# Patient Record
Sex: FEMALE | Race: WHITE | NOT HISPANIC OR LATINO
[De-identification: names, ages, dates, MRNs, and addresses within clinical notes are randomized per-mention and may not be internally consistent; named-entity substitution may affect disease eponyms.]

---

## 2018-03-05 ENCOUNTER — APPOINTMENT (OUTPATIENT)
Dept: FAMILY MEDICINE | Facility: CLINIC | Age: 28
End: 2018-03-05
Payer: MEDICAID

## 2018-03-05 VITALS
HEART RATE: 78 BPM | SYSTOLIC BLOOD PRESSURE: 122 MMHG | RESPIRATION RATE: 16 BRPM | WEIGHT: 280 LBS | OXYGEN SATURATION: 98 % | DIASTOLIC BLOOD PRESSURE: 72 MMHG | HEIGHT: 69 IN | BODY MASS INDEX: 41.47 KG/M2

## 2018-03-05 VITALS — SYSTOLIC BLOOD PRESSURE: 110 MMHG | DIASTOLIC BLOOD PRESSURE: 74 MMHG

## 2018-03-05 DIAGNOSIS — Z13.220 ENCOUNTER FOR SCREENING FOR LIPOID DISORDERS: ICD-10-CM

## 2018-03-05 DIAGNOSIS — Z13.228 ENCOUNTER FOR SCREENING FOR OTHER SUSPECTED ENDOCRINE DISORDER: ICD-10-CM

## 2018-03-05 DIAGNOSIS — Z11.59 ENCOUNTER FOR SCREENING FOR OTHER VIRAL DISEASES: ICD-10-CM

## 2018-03-05 DIAGNOSIS — Z80.49 FAMILY HISTORY OF MALIGNANT NEOPLASM OF OTHER GENITAL ORGANS: ICD-10-CM

## 2018-03-05 DIAGNOSIS — Z87.891 PERSONAL HISTORY OF NICOTINE DEPENDENCE: ICD-10-CM

## 2018-03-05 DIAGNOSIS — Z80.42 FAMILY HISTORY OF MALIGNANT NEOPLASM OF PROSTATE: ICD-10-CM

## 2018-03-05 DIAGNOSIS — Z83.49 FAMILY HISTORY OF OTHER ENDOCRINE, NUTRITIONAL AND METABOLIC DISEASES: ICD-10-CM

## 2018-03-05 DIAGNOSIS — R53.83 OTHER FATIGUE: ICD-10-CM

## 2018-03-05 DIAGNOSIS — Z80.3 FAMILY HISTORY OF MALIGNANT NEOPLASM OF BREAST: ICD-10-CM

## 2018-03-05 DIAGNOSIS — Z83.3 FAMILY HISTORY OF DIABETES MELLITUS: ICD-10-CM

## 2018-03-05 DIAGNOSIS — Z13.29 ENCOUNTER FOR SCREENING FOR OTHER SUSPECTED ENDOCRINE DISORDER: ICD-10-CM

## 2018-03-05 DIAGNOSIS — Z13.0 ENCOUNTER FOR SCREENING FOR OTHER SUSPECTED ENDOCRINE DISORDER: ICD-10-CM

## 2018-03-05 PROCEDURE — 36415 COLL VENOUS BLD VENIPUNCTURE: CPT

## 2018-03-05 PROCEDURE — 99385 PREV VISIT NEW AGE 18-39: CPT | Mod: 25

## 2018-03-05 PROCEDURE — 90715 TDAP VACCINE 7 YRS/> IM: CPT

## 2018-03-05 PROCEDURE — 90471 IMMUNIZATION ADMIN: CPT

## 2018-03-05 PROCEDURE — G0444 DEPRESSION SCREEN ANNUAL: CPT

## 2018-03-06 LAB
25(OH)D3 SERPL-MCNC: 23.2 NG/ML
ALBUMIN SERPL ELPH-MCNC: 4.5 G/DL
ALP BLD-CCNC: 86 U/L
ALT SERPL-CCNC: 43 U/L
ANION GAP SERPL CALC-SCNC: 12 MMOL/L
APPEARANCE: ABNORMAL
AST SERPL-CCNC: 27 U/L
BACTERIA: ABNORMAL
BASOPHILS # BLD AUTO: 0.04 K/UL
BASOPHILS NFR BLD AUTO: 0.4 %
BILIRUB SERPL-MCNC: 0.2 MG/DL
BILIRUBIN URINE: NEGATIVE
BLOOD URINE: ABNORMAL
BUN SERPL-MCNC: 9 MG/DL
CALCIUM SERPL-MCNC: 10.1 MG/DL
CHLORIDE SERPL-SCNC: 103 MMOL/L
CHOLEST SERPL-MCNC: 147 MG/DL
CHOLEST/HDLC SERPL: 3.2 RATIO
CO2 SERPL-SCNC: 27 MMOL/L
COLOR: YELLOW
CREAT SERPL-MCNC: 0.71 MG/DL
EOSINOPHIL # BLD AUTO: 0.13 K/UL
EOSINOPHIL NFR BLD AUTO: 1.3 %
FOLATE SERPL-MCNC: 7.4 NG/ML
GLUCOSE QUALITATIVE U: NEGATIVE MG/DL
GLUCOSE SERPL-MCNC: 102 MG/DL
HBA1C MFR BLD HPLC: 5.3 %
HCT VFR BLD CALC: 43.1 %
HDLC SERPL-MCNC: 46 MG/DL
HGB BLD-MCNC: 14.2 G/DL
HYALINE CASTS: 0 /LPF
IMM GRANULOCYTES NFR BLD AUTO: 0.7 %
KETONES URINE: NEGATIVE
LDLC SERPL CALC-MCNC: 43 MG/DL
LEUKOCYTE ESTERASE URINE: NEGATIVE
LYMPHOCYTES # BLD AUTO: 3 K/UL
LYMPHOCYTES NFR BLD AUTO: 29.6 %
MAN DIFF?: NORMAL
MCHC RBC-ENTMCNC: 28.7 PG
MCHC RBC-ENTMCNC: 32.9 GM/DL
MCV RBC AUTO: 87.2 FL
MEV IGG FLD QL IA: >300 AU/ML
MEV IGG+IGM SER-IMP: POSITIVE
MICROSCOPIC-UA: NORMAL
MONOCYTES # BLD AUTO: 0.43 K/UL
MONOCYTES NFR BLD AUTO: 4.2 %
MUV AB SER-ACNC: POSITIVE
MUV IGG SER QL IA: 230 AU/ML
NEUTROPHILS # BLD AUTO: 6.47 K/UL
NEUTROPHILS NFR BLD AUTO: 63.8 %
NITRITE URINE: NEGATIVE
PH URINE: 7.5
PLATELET # BLD AUTO: 268 K/UL
POTASSIUM SERPL-SCNC: 4.4 MMOL/L
PROT SERPL-MCNC: 7.4 G/DL
PROTEIN URINE: NEGATIVE MG/DL
RBC # BLD: 4.94 M/UL
RBC # FLD: 13.6 %
RED BLOOD CELLS URINE: 2 /HPF
RUBV IGG FLD-ACNC: 1.3 INDEX
RUBV IGG SER-IMP: POSITIVE
SODIUM SERPL-SCNC: 142 MMOL/L
SPECIFIC GRAVITY URINE: 1.02
SQUAMOUS EPITHELIAL CELLS: 20 /HPF
T3FREE SERPL-MCNC: 3.27 PG/ML
T4 FREE SERPL-MCNC: 1.2 NG/DL
THYROGLOB AB SERPL-ACNC: <20 IU/ML
THYROPEROXIDASE AB SERPL IA-ACNC: <10 IU/ML
TRIGL SERPL-MCNC: 289 MG/DL
TSH SERPL-ACNC: 1.71 UIU/ML
UROBILINOGEN URINE: NEGATIVE MG/DL
VIT B12 SERPL-MCNC: 570 PG/ML
VZV AB TITR SER: POSITIVE
VZV IGG SER IF-ACNC: 3101 INDEX
WBC # FLD AUTO: 10.14 K/UL
WHITE BLOOD CELLS URINE: 4 /HPF

## 2018-03-07 LAB
ADJUSTED MITOGEN: >10 IU/ML
ADJUSTED TB AG: 0 IU/ML
M TB IFN-G BLD-IMP: NEGATIVE
QUANTIFERON GOLD NIL: 0.02 IU/ML

## 2018-03-08 ENCOUNTER — APPOINTMENT (OUTPATIENT)
Dept: FAMILY MEDICINE | Facility: CLINIC | Age: 28
End: 2018-03-08
Payer: MEDICAID

## 2018-03-08 VITALS
WEIGHT: 280 LBS | HEIGHT: 69 IN | TEMPERATURE: 98.4 F | SYSTOLIC BLOOD PRESSURE: 114 MMHG | OXYGEN SATURATION: 97 % | HEART RATE: 78 BPM | BODY MASS INDEX: 41.47 KG/M2 | DIASTOLIC BLOOD PRESSURE: 76 MMHG

## 2018-03-08 LAB — ANA SER IF-ACNC: NEGATIVE

## 2018-03-08 PROCEDURE — 99213 OFFICE O/P EST LOW 20 MIN: CPT

## 2018-04-13 ENCOUNTER — APPOINTMENT (OUTPATIENT)
Dept: OBGYN | Facility: CLINIC | Age: 28
End: 2018-04-13

## 2018-09-14 ENCOUNTER — APPOINTMENT (OUTPATIENT)
Dept: FAMILY MEDICINE | Facility: CLINIC | Age: 28
End: 2018-09-14

## 2019-02-19 ENCOUNTER — LABORATORY RESULT (OUTPATIENT)
Age: 29
End: 2019-02-19

## 2019-02-19 ENCOUNTER — APPOINTMENT (OUTPATIENT)
Dept: FAMILY MEDICINE | Facility: CLINIC | Age: 29
End: 2019-02-19
Payer: MEDICAID

## 2019-02-19 VITALS
HEART RATE: 64 BPM | BODY MASS INDEX: 44.41 KG/M2 | SYSTOLIC BLOOD PRESSURE: 118 MMHG | WEIGHT: 293 LBS | HEIGHT: 68 IN | RESPIRATION RATE: 16 BRPM | OXYGEN SATURATION: 97 % | DIASTOLIC BLOOD PRESSURE: 68 MMHG

## 2019-02-19 DIAGNOSIS — Z11.1 ENCOUNTER FOR SCREENING FOR RESPIRATORY TUBERCULOSIS: ICD-10-CM

## 2019-02-19 DIAGNOSIS — Z23 ENCOUNTER FOR IMMUNIZATION: ICD-10-CM

## 2019-02-19 DIAGNOSIS — T80.90XA UNSPECIFIED COMPLICATION FOLLOWING INFUSION AND THERAPEUTIC INJECTION, INITIAL ENCOUNTER: ICD-10-CM

## 2019-02-19 DIAGNOSIS — L03.113 CELLULITIS OF RIGHT UPPER LIMB: ICD-10-CM

## 2019-02-19 PROCEDURE — 99395 PREV VISIT EST AGE 18-39: CPT

## 2019-02-19 RX ORDER — CEFADROXIL 500 MG/1
500 CAPSULE ORAL TWICE DAILY
Qty: 14 | Refills: 0 | Status: DISCONTINUED | COMMUNITY
Start: 2018-03-08 | End: 2019-02-19

## 2019-02-19 NOTE — ASSESSMENT
[FreeTextEntry1] : Health care maintenance:\par check blood work \par follow up with optometry/ophthalmology and dentist for routine exams\par follow up with GYN\par c/w diet and exercise as tolerated\par \par Major Depression:\par start Trintellix, risks and benefits discussed\par advised to see in house behavioral health counselor \par \par Morbid obesity:\par focus on diet and exercise

## 2019-02-19 NOTE — HEALTH RISK ASSESSMENT
[3] : 2) Feeling down, depressed, or hopeless for nearly every day (3) [de-identified] : Not exercising regularly  [PapSmearDate] : 2016 [PapSmearComments] : Patient reported PAP Smear was abnormal. Had colposcopy, results were normal.

## 2019-02-19 NOTE — PHYSICAL EXAM
[No Acute Distress] : no acute distress [Well Nourished] : well nourished [Well Developed] : well developed [Well-Appearing] : well-appearing [Normal Sclera/Conjunctiva] : normal sclera/conjunctiva [PERRL] : pupils equal round and reactive to light [Normal Oropharynx] : the oropharynx was normal [Normal TMs] : both tympanic membranes were normal [Normal Appearance] : was normal in appearance [Neck Supple] : was supple [Normal] : the thyroid was normal [No Respiratory Distress] : no respiratory distress  [Clear to Auscultation] : lungs were clear to auscultation bilaterally [Normal Rate] : normal rate  [Regular Rhythm] : with a regular rhythm [Normal S1, S2] : normal S1 and S2 [No Murmur] : no murmur heard [No Carotid Bruits] : no carotid bruits [No Edema] : there was no peripheral edema [Soft] : abdomen soft [Non Tender] : non-tender [Normal Bowel Sounds] : normal bowel sounds [Normal Posterior Cervical Nodes] : no posterior cervical lymphadenopathy [Normal Anterior Cervical Nodes] : no anterior cervical lymphadenopathy [No Spinal Tenderness] : no spinal tenderness [Grossly Normal Strength/Tone] : grossly normal strength/tone [No Rash] : no rash [No Focal Deficits] : no focal deficits [Alert and Oriented x3] : oriented to person, place, and time [Normal Insight/Judgement] : insight and judgment were intact [Obese] : obese [de-identified] : Obese

## 2019-02-19 NOTE — REVIEW OF SYSTEMS
[Fatigue] : fatigue [Depression] : depression [Negative] : Heme/Lymph [Fever] : no fever [Chills] : no chills

## 2019-02-19 NOTE — HISTORY OF PRESENT ILLNESS
[FreeTextEntry1] : Annual physical  [de-identified] : \par 28 year old female presents for annual physical. 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

## 2019-02-20 ENCOUNTER — RX RENEWAL (OUTPATIENT)
Age: 29
End: 2019-02-20

## 2019-02-20 LAB
BASOPHILS # BLD AUTO: 0.04 K/UL
BASOPHILS NFR BLD AUTO: 0.4 %
EOSINOPHIL # BLD AUTO: 0.11 K/UL
EOSINOPHIL NFR BLD AUTO: 1.2 %
HCT VFR BLD CALC: 45 %
HGB BLD-MCNC: 13.7 G/DL
IMM GRANULOCYTES NFR BLD AUTO: 0.6 %
LYMPHOCYTES # BLD AUTO: 2.55 K/UL
LYMPHOCYTES NFR BLD AUTO: 27 %
MAN DIFF?: NORMAL
MCHC RBC-ENTMCNC: 27.2 PG
MCHC RBC-ENTMCNC: 30.4 GM/DL
MCV RBC AUTO: 89.3 FL
MONOCYTES # BLD AUTO: 0.41 K/UL
MONOCYTES NFR BLD AUTO: 4.3 %
NEUTROPHILS # BLD AUTO: 6.28 K/UL
NEUTROPHILS NFR BLD AUTO: 66.5 %
PLATELET # BLD AUTO: 257 K/UL
RBC # BLD: 5.04 M/UL
RBC # FLD: 14.3 %
WBC # FLD AUTO: 9.45 K/UL

## 2019-02-20 RX ORDER — VORTIOXETINE 10 MG/1
10 TABLET, FILM COATED ORAL DAILY
Qty: 30 | Refills: 2 | Status: DISCONTINUED | COMMUNITY
Start: 2019-02-19 | End: 2019-02-20

## 2019-02-25 LAB
25(OH)D3 SERPL-MCNC: 21.1 NG/ML
ALBUMIN SERPL ELPH-MCNC: 4.5 G/DL
ALP BLD-CCNC: 89 U/L
ALT SERPL-CCNC: 54 U/L
ANION GAP SERPL CALC-SCNC: 11 MMOL/L
APPEARANCE: ABNORMAL
AST SERPL-CCNC: 31 U/L
BACTERIA: ABNORMAL
BILIRUB SERPL-MCNC: <0.2 MG/DL
BILIRUBIN URINE: NEGATIVE
BLOOD URINE: NEGATIVE
BUN SERPL-MCNC: 8 MG/DL
CALCIUM SERPL-MCNC: 9.4 MG/DL
CHLORIDE SERPL-SCNC: 105 MMOL/L
CHOLEST SERPL-MCNC: 138 MG/DL
CHOLEST/HDLC SERPL: 3 RATIO
CO2 SERPL-SCNC: 25 MMOL/L
COLOR: NORMAL
CREAT SERPL-MCNC: 0.82 MG/DL
FOLATE SERPL-MCNC: 15.2 NG/ML
GLUCOSE QUALITATIVE U: NEGATIVE
GLUCOSE SERPL-MCNC: 103 MG/DL
HBA1C MFR BLD HPLC: 5.7 %
HDLC SERPL-MCNC: 46 MG/DL
HYALINE CASTS: 5 /LPF
KETONES URINE: NEGATIVE
LDLC SERPL CALC-MCNC: 79 MG/DL
LEUKOCYTE ESTERASE URINE: ABNORMAL
MICROSCOPIC-UA: NORMAL
NITRITE URINE: NEGATIVE
PH URINE: 6
POTASSIUM SERPL-SCNC: 4.2 MMOL/L
PROT SERPL-MCNC: 7.2 G/DL
PROTEIN URINE: NEGATIVE
RED BLOOD CELLS URINE: 4 /HPF
SODIUM SERPL-SCNC: 141 MMOL/L
SPECIFIC GRAVITY URINE: 1.01
SQUAMOUS EPITHELIAL CELLS: 16 /HPF
T4 FREE SERPL-MCNC: 1.1 NG/DL
TRIGL SERPL-MCNC: 63 MG/DL
TSH SERPL-ACNC: 2.35 UIU/ML
UROBILINOGEN URINE: NORMAL
VIT B12 SERPL-MCNC: 400 PG/ML
WHITE BLOOD CELLS URINE: 9 /HPF

## 2019-03-01 ENCOUNTER — APPOINTMENT (OUTPATIENT)
Dept: FAMILY MEDICINE | Facility: CLINIC | Age: 29
End: 2019-03-01

## 2019-03-08 ENCOUNTER — APPOINTMENT (OUTPATIENT)
Dept: FAMILY MEDICINE | Facility: CLINIC | Age: 29
End: 2019-03-08

## 2019-03-22 ENCOUNTER — APPOINTMENT (OUTPATIENT)
Dept: FAMILY MEDICINE | Facility: CLINIC | Age: 29
End: 2019-03-22

## 2019-04-05 ENCOUNTER — RX RENEWAL (OUTPATIENT)
Age: 29
End: 2019-04-05

## 2019-04-05 ENCOUNTER — APPOINTMENT (OUTPATIENT)
Dept: FAMILY MEDICINE | Facility: CLINIC | Age: 29
End: 2019-04-05

## 2019-04-18 ENCOUNTER — APPOINTMENT (OUTPATIENT)
Dept: FAMILY MEDICINE | Facility: CLINIC | Age: 29
End: 2019-04-18

## 2019-04-22 ENCOUNTER — OTHER (OUTPATIENT)
Age: 29
End: 2019-04-22

## 2019-04-24 ENCOUNTER — RX RENEWAL (OUTPATIENT)
Age: 29
End: 2019-04-24

## 2019-08-07 ENCOUNTER — RX RENEWAL (OUTPATIENT)
Age: 29
End: 2019-08-07

## 2019-08-27 ENCOUNTER — APPOINTMENT (OUTPATIENT)
Dept: FAMILY MEDICINE | Facility: CLINIC | Age: 29
End: 2019-08-27

## 2019-09-06 ENCOUNTER — APPOINTMENT (OUTPATIENT)
Dept: FAMILY MEDICINE | Facility: CLINIC | Age: 29
End: 2019-09-06

## 2019-09-17 ENCOUNTER — APPOINTMENT (OUTPATIENT)
Dept: FAMILY MEDICINE | Facility: CLINIC | Age: 29
End: 2019-09-17

## 2020-01-16 ENCOUNTER — RX RENEWAL (OUTPATIENT)
Age: 30
End: 2020-01-16

## 2022-04-11 PROBLEM — Z11.59 SCREENING FOR VIRAL DISEASE: Status: ACTIVE | Noted: 2018-03-05

## 2023-04-17 ENCOUNTER — TRANSCRIPTION ENCOUNTER (OUTPATIENT)
Age: 33
End: 2023-04-17

## 2023-04-17 ENCOUNTER — NON-APPOINTMENT (OUTPATIENT)
Age: 33
End: 2023-04-17

## 2023-04-17 ENCOUNTER — APPOINTMENT (OUTPATIENT)
Dept: INTERNAL MEDICINE | Facility: CLINIC | Age: 33
End: 2023-04-17
Payer: MEDICAID

## 2023-04-17 VITALS
RESPIRATION RATE: 15 BRPM | HEIGHT: 68 IN | OXYGEN SATURATION: 98 % | TEMPERATURE: 98.9 F | WEIGHT: 293 LBS | SYSTOLIC BLOOD PRESSURE: 154 MMHG | BODY MASS INDEX: 44.41 KG/M2 | DIASTOLIC BLOOD PRESSURE: 82 MMHG | HEART RATE: 68 BPM

## 2023-04-17 VITALS — DIASTOLIC BLOOD PRESSURE: 83 MMHG | SYSTOLIC BLOOD PRESSURE: 128 MMHG | HEART RATE: 72 BPM

## 2023-04-17 DIAGNOSIS — Z82.49 FAMILY HISTORY OF ISCHEMIC HEART DISEASE AND OTHER DISEASES OF THE CIRCULATORY SYSTEM: ICD-10-CM

## 2023-04-17 DIAGNOSIS — R79.89 OTHER SPECIFIED ABNORMAL FINDINGS OF BLOOD CHEMISTRY: ICD-10-CM

## 2023-04-17 PROCEDURE — 93000 ELECTROCARDIOGRAM COMPLETE: CPT

## 2023-04-17 PROCEDURE — G0447 BEHAVIOR COUNSEL OBESITY 15M: CPT

## 2023-04-17 PROCEDURE — 99204 OFFICE O/P NEW MOD 45 MIN: CPT | Mod: 25

## 2023-04-17 NOTE — HISTORY OF PRESENT ILLNESS
[FreeTextEntry1] : pt being seen for HBP.  [FreeTextEntry8] : 41F PMH anxiety, ADHD, and obesity presents for complaint of high blood pressure readings. She states that she was recently prescribed Adderall for her ADHD and noticed a BP reading 160/94 a weeks after starting it. She has been measuring her BP at home, first thing in the morning and a couple hours after with an appropriately sized arm cuff and sitting comfortably in the chair. She said her readings fluctuate in the 150s/80s. She denies any new stress or anxiety at home but does have 3 children she is attempting to home school. She has started to workout and eat healthy recently as well as limit her sodium intake. She denies any headaches, blurry vision, chest pain, shortness of breath, abdominal pain, n/v/d/c, edema. She notes she is eating and drinking as per usual. Her family history is significant for her father with HTN. In regards to the Adderall, she has stopped taking it for the past 3 days.\par \par Of note, on 4/12, she has had blood work done with cbc, a1c, tsh, lipids unremarkable. The cmp was normal with the exception of her liver enzymes (ALT 33 and ALT 46). She denies any smoking or alcohol use. \par

## 2023-04-17 NOTE — PHYSICAL EXAM
[Normal] : soft, non-tender, non-distended, no masses palpated, no HSM and normal bowel sounds [de-identified] : obese female

## 2023-04-17 NOTE — PLAN
[FreeTextEntry1] : \par 41F PMH anxiety, ADHD, and obesity presents for complaint of high blood pressure readings.\par \par # elevated blood pressure readings\par - presents to the clinic complaining of elevated BP on multiple home readings - 150s/80s, measured with a cuff pressure while in chair with 2 morning readings. On exam patient appears relatively calm and admits to starting Adderall 2 weeks ago.\par - Patient is otherwise asymptomatic without confusion, headaches, blurry vision, chest pain, shortness of breath, edema.\par - BP today is 154/82 and 128/83 on repeat. HR 70s\par - Patient is not currently on medication for BP\par - 4/12 -  blood work done with cbc, a1c, tsh unremarkable. The cmp was normal with the exception of her liver enzymes (ALT 33 and ALT 46). She denies any smoking or alcohol use. \par - EKG with no ischemic findings - HR 70, \par - patient counseled to repeat blood pressures - perform measurements in a quiet room after five minutes of rest in the seated position with the back and arm supported and legs uncrossed. Will inform me of readings in her next appt next week.\par - patient self dc'd Adderall for now as it can contribute to pressures and will discuss further treatment with her psychiatric NP that she sees.\par - f/u with me in one week\par \par # obesity\par - patient with BMI 50\par - a1c 5.6, lipids unremarkable, elevated AST/ALT\par - patient counseled on weight loss and is implementing a plan to eat healthier and exercising\par - discussed options of injectable weight loss meds and consultation with bariatric surgeon. Patient is hesitant right now but understands the importance. Will discuss more at next visit\par \par # elevate LFTs\par - ALT 33 and ALT 46 (ALT 54 in 2019. Patient denies alcohol or smoking. Most likely VANG given obesity of BMI>50. Patient will focus on lifestyle modifications for now\par - Liver US ordered\par  \par

## 2023-04-17 NOTE — PHYSICAL EXAM
[Normal] : soft, non-tender, non-distended, no masses palpated, no HSM and normal bowel sounds [de-identified] : obese female

## 2023-04-24 ENCOUNTER — APPOINTMENT (OUTPATIENT)
Dept: INTERNAL MEDICINE | Facility: CLINIC | Age: 33
End: 2023-04-24
Payer: MEDICAID

## 2023-04-24 VITALS
BODY MASS INDEX: 44.41 KG/M2 | WEIGHT: 293 LBS | RESPIRATION RATE: 15 BRPM | HEIGHT: 68 IN | OXYGEN SATURATION: 99 % | SYSTOLIC BLOOD PRESSURE: 122 MMHG | TEMPERATURE: 97.8 F | HEART RATE: 65 BPM | DIASTOLIC BLOOD PRESSURE: 70 MMHG

## 2023-04-24 DIAGNOSIS — E66.01 MORBID (SEVERE) OBESITY DUE TO EXCESS CALORIES: ICD-10-CM

## 2023-04-24 DIAGNOSIS — R03.0 ELEVATED BLOOD-PRESSURE READING, W/OUT DIAGNOSIS OF HYPERTENSION: ICD-10-CM

## 2023-04-24 PROCEDURE — 99212 OFFICE O/P EST SF 10 MIN: CPT

## 2023-04-24 NOTE — HISTORY OF PRESENT ILLNESS
[FreeTextEntry1] : Pt being seen for a physical exam and to follow up on blood pressure. [de-identified] : 41F PMH anxiety, ADHD, and obesity presents for a blood pressure follow up visit. She was seen by me last week for concern for asymptomatic elevation in her BPs. She kept a log this past week (attached to her chart) of pressures measured in the morning and at night with the right sized cuff sitting in a chair with arm at heart level./ Her average readings were 140/83 with some high values that she attributes to moments fo stress. Since the visit last week she has been off the Adderall and noticed that she feels more "scattered" and stressed with dealing with things at home and her children. Since last week she has been walking more and joined a gym and is also watching her salt ingestion to try to keep it at a minimum. Otherwise, she denies any headaches, chest pain, shortness of breath, abdominal pain, n/v/d/c, edema. She has an appt in a few days with her psychiatric NP regarding her ADHD and if there is an alternative medication to help with ADHD as well as BP that is appropriate for her.

## 2023-04-24 NOTE — HISTORY OF PRESENT ILLNESS
[FreeTextEntry1] : Pt being seen for a physical exam and to follow up on blood pressure. [de-identified] : 41F PMH anxiety, ADHD, and obesity presents for a blood pressure follow up visit. She was seen by me last week for concern for asymptomatic elevation in her BPs. She kept a log this past week (attached to her chart) of pressures measured in the morning and at night with the right sized cuff sitting in a chair with arm at heart level./ Her average readings were 140/83 with some high values that she attributes to moments fo stress. Since the visit last week she has been off the Adderall and noticed that she feels more "scattered" and stressed with dealing with things at home and her children. Since last week she has been walking more and joined a gym and is also watching her salt ingestion to try to keep it at a minimum. Otherwise, she denies any headaches, chest pain, shortness of breath, abdominal pain, n/v/d/c, edema. She has an appt in a few days with her psychiatric NP regarding her ADHD and if there is an alternative medication to help with ADHD as well as BP that is appropriate for her.

## 2023-04-24 NOTE — PLAN
[FreeTextEntry1] : 41F PMH anxiety, ADHD, and obesity presents for a blood pressure follow up visit. \par \par # high blood pressure\par - today she brought in log on averages of 140/83 over the past week with measurements taken in the morning at at night. In office pressures are 122/70, HR 65.\par - she endorse some stresses at home with her children and daily routines. Denies depression and anxiety at this time and attributes difficulty to cope with stress due to her ADHD\par - she denies any headaches, blurry vision, chest pain, shortness of breath, and edema\par - she enrolled in a gym, started walking everyday, and is watching her salt content since our last appt\par - decision was made to go back on Adderall since it will help her stay focused and reduce the stress - she reports "it has been hard being off of it" - OK for now since it doesn't cause extreme elevations in pressure. Will have appt in a few days with psychiatric NP discussing other options if they feel necessary\par - patient will record another week of logs and will call me in a week to discuss results once she is back on the Adderall - decision will be made to initiate antihypertensives at that time. \par - Patient understands that the elevated pressures can be attributed to weight, diet, stress, etc and is willing to make adjustments prior to starting medications.\par - STOPBANG 2 - low risk of JOSHUA - patient denies snoring/apneic episodes and fatigue during day. 10 year old female, denies past medical history, presenting with 1 week of hives intermittently. Mother states currently patient is asymptomatic but hives come and go. No history of this in the past, and no fevers, vomiting, dyspnea or any other symptoms. No new detergents, soaps, lotions or other topical skin products. No other symptoms.

## 2023-04-24 NOTE — PLAN
[FreeTextEntry1] : 41F PMH anxiety, ADHD, and obesity presents for a blood pressure follow up visit. \par \par # high blood pressure\par - today she brought in log on averages of 140/83 over the past week with measurements taken in the morning at at night. In office pressures are 122/70, HR 65.\par - she endorse some stresses at home with her children and daily routines. Denies depression and anxiety at this time and attributes difficulty to cope with stress due to her ADHD\par - she denies any headaches, blurry vision, chest pain, shortness of breath, and edema\par - she enrolled in a gym, started walking everyday, and is watching her salt content since our last appt\par - decision was made to go back on Adderall since it will help her stay focused and reduce the stress - she reports "it has been hard being off of it" - OK for now since it doesn't cause extreme elevations in pressure. Will have appt in a few days with psychiatric NP discussing other options if they feel necessary\par - patient will record another week of logs and will call me in a week to discuss results once she is back on the Adderall - decision will be made to initiate antihypertensives at that time. \par - Patient understands that the elevated pressures can be attributed to weight, diet, stress, etc and is willing to make adjustments prior to starting medications.\par - STOPBANG 2 - low risk of JOSHUA - patient denies snoring/apneic episodes and fatigue during day.

## 2024-03-22 ENCOUNTER — APPOINTMENT (OUTPATIENT)
Dept: INTERNAL MEDICINE | Facility: CLINIC | Age: 34
End: 2024-03-22
Payer: MEDICAID

## 2024-03-22 ENCOUNTER — NON-APPOINTMENT (OUTPATIENT)
Age: 34
End: 2024-03-22

## 2024-03-22 VITALS
HEIGHT: 68 IN | DIASTOLIC BLOOD PRESSURE: 78 MMHG | SYSTOLIC BLOOD PRESSURE: 120 MMHG | WEIGHT: 293 LBS | BODY MASS INDEX: 44.41 KG/M2 | RESPIRATION RATE: 16 BRPM | OXYGEN SATURATION: 99 % | HEART RATE: 79 BPM | TEMPERATURE: 97.9 F

## 2024-03-22 DIAGNOSIS — Z86.59 PERSONAL HISTORY OF OTHER MENTAL AND BEHAVIORAL DISORDERS: ICD-10-CM

## 2024-03-22 DIAGNOSIS — Z51.81 ENCOUNTER FOR THERAPEUTIC DRUG LVL MONITORING: ICD-10-CM

## 2024-03-22 DIAGNOSIS — E66.9 OBESITY, UNSPECIFIED: ICD-10-CM

## 2024-03-22 DIAGNOSIS — Z00.00 ENCOUNTER FOR GENERAL ADULT MEDICAL EXAMINATION W/OUT ABNORMAL FINDINGS: ICD-10-CM

## 2024-03-22 DIAGNOSIS — E55.9 VITAMIN D DEFICIENCY, UNSPECIFIED: ICD-10-CM

## 2024-03-22 PROCEDURE — G0444 DEPRESSION SCREEN ANNUAL: CPT | Mod: 59

## 2024-03-22 PROCEDURE — 99395 PREV VISIT EST AGE 18-39: CPT

## 2024-03-22 RX ORDER — PAROXETINE HYDROCHLORIDE 40 MG/1
40 TABLET, FILM COATED ORAL DAILY
Qty: 30 | Refills: 0 | Status: DISCONTINUED | COMMUNITY
Start: 2019-02-20 | End: 2024-03-22

## 2024-03-22 NOTE — PLAN
[FreeTextEntry1] : 33F PMH ADHD, elevated LFTs presents for an annual physical.  # umbilical hernia - umbilical hernia found during pregnancy 3 years ago - now causing her pain and discomfort when she is lifting but is reducible at rest - on exam with umbilical hernia visualized, soft, and reducible, no signs of strangulation - given referral to surgery - advised weight loss and alarm symptoms of when to seek emergent care - immense pain, strangulation, etc, none on exam today  # ADHD - on Adderall prescribed by her psychiatric NP - tolerating it well no HTN, palpations, chest pain, shortness of breath, headaches - drug screen ordered today - EKG NSR no acute ischemic changes  # obesity - BMI 46 - patient trying to implement physical activity and healthy diet - encouraged Mediterranean diet - metabolic workup ordered including - cbc, cmp, a1c, lipids, tsh   # screenings - negative depression screening - pap reported normal 3 years ago - given referral for GYN - declines HIV/HCV screening  # vaccines - declines covid vaccines - tdap in 2018 - reports getting flu shot this season

## 2024-03-22 NOTE — HEALTH RISK ASSESSMENT
[Good] : ~his/her~  mood as  good [Yes] : Yes [Never (0 pts)] : Never (0 points) [Monthly or less (1 pt)] : Monthly or less (1 point) [No] : In the past 12 months have you used drugs other than those required for medical reasons? No [No falls in past year] : Patient reported no falls in the past year [PHQ-2 Negative - No further assessment needed] : PHQ-2 Negative - No further assessment needed [0] : 2) Feeling down, depressed, or hopeless: Not at all (0) [Audit-CScore] : 1 [de-identified] : yoga, walking [WJD1Vwtvs] : 0 [de-identified] : gluten-free [LowDoseCTScan] : NA [EyeExamDate] : NA [HIV test declined] : HIV test declined [Patient reported PAP Smear was normal] : Patient reported PAP Smear was normal [Hepatitis C test declined] : Hepatitis C test declined [Language] : denies difficulty with language [Change in mental status noted] : No change in mental status noted [Behavior] : denies difficulty with behavior [Learning/Retaining New Information] : denies difficulty learning/retaining new information [Handling Complex Tasks] : denies difficulty handling complex tasks [Reasoning] : denies difficulty with reasoning [Spatial Ability and Orientation] : denies difficulty with spatial ability and orientation [None] : None [With Family] : lives with family [Employed] : employed [] :  [# Of Children ___] : has [unfilled] children [Fully functional (using the telephone, shopping, preparing meals, housekeeping, doing laundry, using] : Fully functional and needs no help or supervision to perform IADLs (using the telephone, shopping, preparing meals, housekeeping, doing laundry, using transportation, managing medications and managing finances) [Fully functional (bathing, dressing, toileting, transferring, walking, feeding)] : Fully functional (bathing, dressing, toileting, transferring, walking, feeding) [Reports changes in vision] : Reports no changes in vision [Reports changes in hearing] : Reports no changes in hearing [Reports normal functional visual acuity (ie: able to read med bottle)] : Reports normal functional visual acuity [Reports changes in dental health] : Reports no changes in dental health [MammogramDate] : NA [BoneDensityDate] : NA [PapSmearDate] : 3 years ago [ColonoscopyDate] : NA [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

## 2024-03-22 NOTE — HISTORY OF PRESENT ILLNESS
[de-identified] : 33F PMH ADHD, elevated LFTs presents for an annual physical.  She has a complaint of an umbilical hernia which was diagnosed with her pregnancy with her son 3 years ago. She notes it bothers her when she is training and tend to protrude but then able to go back inside. She has not done anything about it or seen any specialists. She denies any symptom of strangulation of the hernia at this time. She is obese and trying to lose weight but implementing yoga and walks. She eats a gluten-free diet.  She takes Adderall prescribed by psychiatric NP which helps her symptoms of ADHD. She is requesting a drug screen and EKG which is needed by her NP to monitor and continue to prescribe the medication. She denies any headaches, palpitations, chest pain, shortness of breath. She is tolerating the medication well.   Otherwise, she denies any smoking and drug use. She drinks alcohol rarely. Her last pap was normal 3 years ago. Her LMP was on 3/3/2024 and she usually gets them every5 weeks with heavy bleeding in which the first 2 days she is changing 2 pads an hour. She lives at home with her  and 3 children and is employed as an LPN. Her tdap was in 2018. She did not receive the covid vaccine and is declining. She got the flu shot this season.

## 2024-03-22 NOTE — PHYSICAL EXAM
[Normal] : affect was normal and insight and judgment were intact [de-identified] : umbilical hernia visualized, soft, and reducible, no signs of strangulation

## 2024-03-25 ENCOUNTER — APPOINTMENT (OUTPATIENT)
Dept: SURGERY | Facility: CLINIC | Age: 34
End: 2024-03-25
Payer: MEDICAID

## 2024-03-25 VITALS
HEART RATE: 79 BPM | DIASTOLIC BLOOD PRESSURE: 83 MMHG | RESPIRATION RATE: 16 BRPM | BODY MASS INDEX: 45.45 KG/M2 | HEIGHT: 67.5 IN | SYSTOLIC BLOOD PRESSURE: 133 MMHG | OXYGEN SATURATION: 96 % | WEIGHT: 293 LBS | TEMPERATURE: 98 F

## 2024-03-25 LAB
25(OH)D3 SERPL-MCNC: 18 NG/ML
ALBUMIN SERPL ELPH-MCNC: 4.6 G/DL
ALP BLD-CCNC: 103 U/L
ALT SERPL-CCNC: 35 U/L
ANION GAP SERPL CALC-SCNC: 12 MMOL/L
AST SERPL-CCNC: 19 U/L
BASOPHILS # BLD AUTO: 0.09 K/UL
BASOPHILS NFR BLD AUTO: 0.9 %
BILIRUB SERPL-MCNC: 0.2 MG/DL
BUN SERPL-MCNC: 13 MG/DL
CALCIUM SERPL-MCNC: 9.5 MG/DL
CHLORIDE SERPL-SCNC: 103 MMOL/L
CHOLEST SERPL-MCNC: 182 MG/DL
CO2 SERPL-SCNC: 24 MMOL/L
CREAT SERPL-MCNC: 0.8 MG/DL
EGFR: 98 ML/MIN/1.73M2
EOSINOPHIL # BLD AUTO: 0.09 K/UL
EOSINOPHIL NFR BLD AUTO: 0.9 %
ESTIMATED AVERAGE GLUCOSE: 111 MG/DL
GLUCOSE SERPL-MCNC: 93 MG/DL
HBA1C MFR BLD HPLC: 5.5 %
HCT VFR BLD CALC: 43.2 %
HDLC SERPL-MCNC: 68 MG/DL
HGB BLD-MCNC: 13.7 G/DL
IMM GRANULOCYTES NFR BLD AUTO: 1.3 %
LDLC SERPL CALC-MCNC: 101 MG/DL
LYMPHOCYTES # BLD AUTO: 3.43 K/UL
LYMPHOCYTES NFR BLD AUTO: 33.6 %
MAN DIFF?: NORMAL
MCHC RBC-ENTMCNC: 27.5 PG
MCHC RBC-ENTMCNC: 31.7 GM/DL
MCV RBC AUTO: 86.6 FL
MONOCYTES # BLD AUTO: 0.52 K/UL
MONOCYTES NFR BLD AUTO: 5.1 %
NEUTROPHILS # BLD AUTO: 5.95 K/UL
NEUTROPHILS NFR BLD AUTO: 58.2 %
NONHDLC SERPL-MCNC: 114 MG/DL
PLATELET # BLD AUTO: 288 K/UL
POTASSIUM SERPL-SCNC: 4.4 MMOL/L
PROT SERPL-MCNC: 7.1 G/DL
RBC # BLD: 4.99 M/UL
RBC # FLD: 14.2 %
SODIUM SERPL-SCNC: 140 MMOL/L
TRIGL SERPL-MCNC: 70 MG/DL
TSH SERPL-ACNC: 2.75 UIU/ML
WBC # FLD AUTO: 10.21 K/UL

## 2024-03-25 PROCEDURE — 99205 OFFICE O/P NEW HI 60 MIN: CPT

## 2024-03-26 LAB
AMPHET UR-MCNC: NEGATIVE NG/ML
BARBITURATES UR-MCNC: NEGATIVE NG/ML
BENZODIAZ UR-MCNC: NEGATIVE NG/ML
COCAINE METAB.OTHER UR-MCNC: NEGATIVE NG/ML
CREATININE, URINE: 40.5 MG/DL
FENTANYL, URINE: NEGATIVE NG/ML
METHADONE UR-MCNC: NEGATIVE NG/ML
OPIATES UR-MCNC: NEGATIVE NG/ML
OXYCODONE/OXYMORPHONE, URINE: NEGATIVE NG/ML
PCP UR-MCNC: NEGATIVE NG/ML
PH, URINE: 7.4
PLEASE NOTE: DRUGSCRUR: NORMAL
THC UR QL: NEGATIVE NG/ML

## 2024-03-28 ENCOUNTER — OUTPATIENT (OUTPATIENT)
Dept: OUTPATIENT SERVICES | Facility: HOSPITAL | Age: 34
LOS: 1 days | End: 2024-03-28
Payer: MEDICAID

## 2024-03-28 VITALS
RESPIRATION RATE: 14 BRPM | OXYGEN SATURATION: 97 % | WEIGHT: 293 LBS | DIASTOLIC BLOOD PRESSURE: 82 MMHG | SYSTOLIC BLOOD PRESSURE: 126 MMHG | HEART RATE: 64 BPM | TEMPERATURE: 97 F | HEIGHT: 68 IN

## 2024-03-28 DIAGNOSIS — K42.9 UMBILICAL HERNIA WITHOUT OBSTRUCTION OR GANGRENE: ICD-10-CM

## 2024-03-28 DIAGNOSIS — Z13.89 ENCOUNTER FOR SCREENING FOR OTHER DISORDER: ICD-10-CM

## 2024-03-28 DIAGNOSIS — Z01.818 ENCOUNTER FOR OTHER PREPROCEDURAL EXAMINATION: ICD-10-CM

## 2024-03-28 DIAGNOSIS — Z29.9 ENCOUNTER FOR PROPHYLACTIC MEASURES, UNSPECIFIED: ICD-10-CM

## 2024-03-28 LAB
APTT BLD: 33 SEC — SIGNIFICANT CHANGE UP (ref 24.5–35.6)
BLD GP AB SCN SERPL QL: SIGNIFICANT CHANGE UP
HCG SERPL-ACNC: <4 MIU/ML — SIGNIFICANT CHANGE UP
INR BLD: 0.99 RATIO — SIGNIFICANT CHANGE UP (ref 0.85–1.18)
MRSA PCR RESULT.: SIGNIFICANT CHANGE UP
PROTHROM AB SERPL-ACNC: 11 SEC — SIGNIFICANT CHANGE UP (ref 9.5–13)
S AUREUS DNA NOSE QL NAA+PROBE: SIGNIFICANT CHANGE UP

## 2024-03-28 PROCEDURE — G0463: CPT

## 2024-03-28 RX ORDER — CEFAZOLIN SODIUM 1 G
3000 VIAL (EA) INJECTION ONCE
Refills: 0 | Status: COMPLETED | OUTPATIENT
Start: 2024-04-23 | End: 2024-04-23

## 2024-03-28 NOTE — H&P PST ADULT - HEMATOLOGY/LYMPHATICS
Cardiac Surgery Care Coordinator- Met with the family of Zackary Haynes, introduced role of the Cardiac Surgery Coordinator. Reviewed plan of care and day of surgery expectations. Provided family with an update from OR. Encouraged family to verbalize and emotional support given. Will continue to update throughout the day. Lucila Freeman RN    3434 - Met with family of Zackary Pendletoncandelario, provided family with update of on by-pass. Family without questions or concerns at this time. Will continue to follow for educational and emotional needs. Lucila Freeman RN    5086 - Met with family of Zackary Dallas, provided family with update of warming. Family without questions or concerns at this time. Will continue to follow. Lucila Freeman RN    1100 - Met with family of Zackary Pendletoncandelario, provided family with update of off by-pass. Family without questions or concerns at this time. Will continue to follow. Lucila Freeman RN    7073 - Met with Pamela dang and Dr. Jamaal Acosta. Update given. Encouraged family to verbalize and offered emotional support. Reinforced waiting room instructions. Family to wait in the CCU waiting room until contacted by the nursing staff.  Lucila Freeman RN details…

## 2024-03-28 NOTE — H&P PST ADULT - RESPIRATORY AND THORAX
CHIEF COMPLAINT:    Chief Complaint   Patient presents with   • Sinus Problem   • Cough       SUBJECTIVE:    Parul Blair is a 23 year old female who presented requesting evaluation for headaches, nasal congestion, sinus pressure, fevers and significant cough over the past several days.  She reports chest congestion and slight shortness of breath.  She has nausea without vomiting.  Reports right-sided chest wall pain and sore throat from coughing.    REVIEW OF SYSTEMS:  All other systems are reviewed and are negative except as documented in the history of present illness.     Past Medical History:   Diagnosis Date   • Asthma     2023 spirometry without bronchodilator response. Needs methacholine challenge to confirm diagnosis. Suspect VCD.   • Heel pain, bilateral 2022    Has been going on for >1 week, has pain with standing, walking, movement, no pain at rest. Aleve didn't help. Burning sharp pain. .she has tried orthotics. Pain isn't worse first thing in the morning, pain occurs throughout the day.     • Infection due to severe acute respiratory syndrome coronavirus 2 (SARS-CoV-2) 2022    SARS-CoV-2 by PCR+   • Non-allergic rhinitis      Past Surgical History:   Procedure Laterality Date   • Appendectomy     •  section, classic          Social History     Tobacco Use   • Smoking status: Never   • Smokeless tobacco: Never   Substance Use Topics   • Alcohol use: Not Currently     Comment: occasionally       OBJECTIVE:  PHYSICAL EXAM:   Pulse Ox Interpretation:  Within normal limits.  General:   Alert, cooperative, conversive in no acute distress.  Skin:  Warm and dry without rash.    Neck:  Trachea is midline.  No cervical lymphadenopathy.  Eyes:  Normal conjunctivae and sclerae.   ENT:  Mild nasal congestion.  Moderate posterior pharyngeal erythema.  Mucous membranes are moist.   Cardiovascular:  Regular rate and rhythm without murmur.  Respiratory:   Normal respiratory effort.  Clear to  auscultation.  No wheezes, rales or rhonchi.  Psychiatric:  Cooperative.  Appropriate mood and affect.    Chest x-ray is unremarkable.  Strep PCR and COVID/flu/RSV PCR testing is negative.    ASSESSMENT AND PLAN:   1. Acute cough    2. Sore throat    Above testing unremarkable, likely other acute viral respiratory infection.  Albuterol inhaler is refilled for symptomatic relief.  Also given 5 day course of prednisone to be started tomorrow morning.  Prescribed Tessalon Perles to be used as needed for symptomatic relief.  No indication for antibiotics today.  Recommend follow-up examination not improving over the next 3-5 days as expected.       Orders Placed This Encounter   • XR CHEST PA AND LATERAL 2 VIEWS   • POCT COVID/Flu/RSV Panel via Cepheid   • POCT Streptococcus Group A PCR   • albuterol (Ventolin HFA) 108 (90 Base) MCG/ACT inhaler   • predniSONE (DELTASONE) 20 MG tablet   • benzonatate (TESSALON PERLES) 200 MG capsule       Instructions provided as documented in the after visit summary.  The patient indicated understanding of the diagnosis and agreed with the plan of care.     Please note that this chart was generated using voice recognition Fluency Direct dictation software.  Although every effort was made to ensure the accuracy of this transcription, some errors may have occurred.     none details…

## 2024-03-28 NOTE — H&P PST ADULT - NSICDXFAMILYHX_GEN_ALL_CORE_FT
FAMILY HISTORY:  Mother  Still living? Unknown  FH: hypothyroidism, Age at diagnosis: Age Unknown     FAMILY HISTORY:  Mother  Still living? Unknown  FH: diabetes mellitus, Age at diagnosis: Age Unknown  FH: hypothyroidism, Age at diagnosis: Age Unknown    Grandparent  Still living? Unknown  FH: prostate cancer, Age at diagnosis: Age Unknown  FH: uterine cancer, Age at diagnosis: Age Unknown

## 2024-03-28 NOTE — H&P PST ADULT - PROBLEM SELECTOR PLAN 3
preop assessment, robotic extraperitoneal ventral hernia repair w/Dr Lawson scheduled for 4/10/2024, pending medical clearance

## 2024-03-28 NOTE — H&P PST ADULT - GASTROINTESTINAL
soft/nontender/normal active bowel sounds/distended large abdomen/soft/normal active bowel sounds/tender details…

## 2024-03-28 NOTE — H&P PST ADULT - NSICDXPASTMEDICALHX_GEN_ALL_CORE_FT
PAST MEDICAL HISTORY:  Umbilical hernia without obstruction or gangrene      PAST MEDICAL HISTORY:  Obese     Umbilical hernia without obstruction or gangrene

## 2024-03-28 NOTE — H&P PST ADULT - HISTORY OF PRESENT ILLNESS
34 yo F PMH of ADHD, presents with c/o umbilical hernia which was diagnosed with her pregnancy with her son 3 years ago. 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 She denies any symptom of strangulation of the hernia. Preop assessment prior to robotic extraperitoneal ventral hernia repair w/Dr Lawson scheduled for 4/10/2024, pending medical clearance 34 yo F PMH of ADHD, obesity, presents with c/o umbilical hernia which patient states was first noted with her 1st pregnancy 8 years ago and became larger with her last pregnancy 3 years ago. Patient notes some discomfort from the hernia. She denies fevers, chills, nausea, vomiting, or strangulation of the hernia. Preop assessment prior to robotic extraperitoneal ventral hernia repair w/Dr Lawson scheduled for 4/10/2024, pending medical clearance

## 2024-03-28 NOTE — H&P PST ADULT - ASSESSMENT
34 yo F presents for preop assessment prior to robotic extraperitoneal ventral hernia repair w/Dr Lawson scheduled for 4/10/2024, pending medical clearance    Patient was educated on preop preparation with written and verbal instructions. Pt was informed to obtain clearance >3 days before surgery. Pt was educated on NSAIDs, multivitamins and herbals that increase the risk of bleeding and need to be stopped 7 days before procedure. Pt verbalized understanding of the above.     OPIOID RISK TOOL    RALPH EACH BOX THAT APPLIES AND ADD TOTALS AT THE END    FAMILY HISTORY OF SUBSTANCE ABUSE                 FEMALE         MALE                                                Alcohol                             [  ]1 pt          [  ]3pts                                               Illegal Durgs                     [  ]2 pts        [  ]3pts                                               Rx Drugs                           [  ]4 pts        [  ]4 pts    PERSONAL HISTORY OF SUBSTANCE ABUSE                                                                                          Alcohol                             [  ]3 pts       [  ]3 pts                                               Illegal Drugs                     [  ]4 pts        [  ]4 pts                                               Rx Drugs                           [  ]5 pts        [  ]5 pts    AGE BETWEEN 16-45 YEARS                                      [x  ]1 pt         [  ]1 pt    HISTORY OF PREADOLESCENT   SEXUAL ABUSE                                                             [  ]3 pts        [  ]0pts    PSYCHOLOGICAL DISEASE                     ADD, OCD, Bipolar, Schizophrenia        [ x ]2 pts         [  ]2 pts                      Depression                                               [  ]1 pt           [  ]1 pt           SCORING TOTAL   (add numbers and type here)              ( 3 )                                     A score of 3 or lower indicated LOW risk for future opioid abuse  A score of 4 to 7 indicated moderate risk for future opioid abuse  A score of 8 or higher indicates a high risk for opioid abuse    CAPRINI VTE 2.0 SCORE [CLOT updated 2019]    AGE RELATED RISK FACTORS                                                       MOBILITY RELATED FACTORS  [ ] Age 41-60 years                                            (1 Point)                    [ ] Bed rest                                                        (1 Point)  [ ] Age: 61-74 years                                           (2 Points)                  [ ] Plaster cast                                                   (2 Points)  [ ] Age= 75 years                                              (3 Points)                    [ ] Bed bound for more than 72 hours                 (2 Points)    DISEASE RELATED RISK FACTORS                                               GENDER SPECIFIC FACTORS  [ ] Edema in the lower extremities                       (1 Point)              [ ] Pregnancy                                                     (1 Point)  [ ] Varicose veins                                               (1 Point)                     [ ] Post-partum < 6 weeks                                   (1 Point)             [ x] BMI > 25 Kg/m2                                            (1 Point)                     [ ] Hormonal therapy  or oral contraception          (1 Point)                 [ ] Sepsis (in the previous month)                        (1 Point)               [ ] History of pregnancy complications                 (1 point)  [ ] Pneumonia or serious lung disease                                               [ ] Unexplained or recurrent                     (1 Point)           (in the previous month)                               (1 Point)  [ ] Abnormal pulmonary function test                     (1 Point)                 SURGERY RELATED RISK FACTORS  [ ] Acute myocardial infarction                              (1 Point)               [ ]  Section                                             (1 Point)  [ ] Congestive heart failure (in the previous month)  (1 Point)      [ ] Minor surgery                                                  (1 Point)   [ ] Inflammatory bowel disease                             (1 Point)               [ ] Arthroscopic surgery                                        (2 Points)  [ ] Central venous access                                      (2 Points)                [x ] General surgery lasting more than 45 minutes (2 points)  [ ] Malignancy- Present or previous                   (2 Points)                [ ] Elective arthroplasty                                         (5 points)    [ ] Stroke (in the previous month)                          (5 Points)                                                                                                                                                           HEMATOLOGY RELATED FACTORS                                                 TRAUMA RELATED RISK FACTORS  [ ] Prior episodes of VTE                                     (3 Points)                [ ] Fracture of the hip, pelvis, or leg                       (5 Points)  [ ] Positive family history for VTE                         (3 Points)             [ ] Acute spinal cord injury (in the previous month)  (5 Points)  [ ] Prothrombin 52475 A                                     (3 Points)               [ ] Paralysis  (less than 1 month)                             (5 Points)  [ ] Factor V Leiden                                             (3 Points)                  [ ] Multiple Trauma within 1 month                        (5 Points)  [ ] Lupus anticoagulants                                     (3 Points)                                                           [ ] Anticardiolipin antibodies                               (3 Points)                                                       [ ] High homocysteine in the blood                      (3 Points)                                             [ ] Other congenital or acquired thrombophilia      (3 Points)                                                [ ] Heparin induced thrombocytopenia                  (3 Points)                                     Total Score [      3    ] 34 yo F PMH of ADHD, obesity, presents with c/o umbilical hernia which patient states was first noted with her 1st pregnancy 8 years ago and became larger with her last pregnancy 3 years ago. Patient notes some discomfort from the hernia. She denies fevers, chills, nausea, vomiting, or strangulation of the hernia. Preop assessment prior to robotic extraperitoneal ventral hernia repair w/Dr Lawson scheduled for 4/10/2024, pending medical clearance    Patient was educated on preop preparation with written and verbal instructions. Pt was informed to obtain medical clearance >3 days before surgery. Pt was educated on NSAIDs, multivitamins and herbals that increase the risk of bleeding and need to be stopped 7 days before procedure. Pt verbalized understanding of the above.     OPIOID RISK TOOL    RALPH EACH BOX THAT APPLIES AND ADD TOTALS AT THE END    FAMILY HISTORY OF SUBSTANCE ABUSE                 FEMALE         MALE                                                Alcohol                             [  ]1 pt          [  ]3pts                                               Illegal Drugs                     [  ]2 pts        [  ]3pts                                               Rx Drugs                           [  ]4 pts        [  ]4 pts    PERSONAL HISTORY OF SUBSTANCE ABUSE                                                                                          Alcohol                             [  ]3 pts       [  ]3 pts                                               Illegal Drugs                     [  ]4 pts        [  ]4 pts                                               Rx Drugs                           [  ]5 pts        [  ]5 pts    AGE BETWEEN 16-45 YEARS                                      [x  ]1 pt         [  ]1 pt    HISTORY OF PREADOLESCENT   SEXUAL ABUSE                                                             [  ]3 pts        [  ]0pts    PSYCHOLOGICAL DISEASE                     ADD, OCD, Bipolar, Schizophrenia        [ x ]2 pts         [  ]2 pts                      Depression                                               [  ]1 pt           [  ]1 pt           SCORING TOTAL   (add numbers and type here)              ( 3 )                                     A score of 3 or lower indicated LOW risk for future opioid abuse  A score of 4 to 7 indicated moderate risk for future opioid abuse  A score of 8 or higher indicates a high risk for opioid abuse    JUANI VTE 2.0 SCORE [CLOT updated 2019]    AGE RELATED RISK FACTORS                                                       MOBILITY RELATED FACTORS  [ ] Age 41-60 years                                            (1 Point)                    [ ] Bed rest                                                        (1 Point)  [ ] Age: 61-74 years                                           (2 Points)                  [ ] Plaster cast                                                   (2 Points)  [ ] Age= 75 years                                              (3 Points)                    [ ] Bed bound for more than 72 hours                 (2 Points)    DISEASE RELATED RISK FACTORS                                               GENDER SPECIFIC FACTORS  [ ] Edema in the lower extremities                       (1 Point)              [ ] Pregnancy                                                     (1 Point)  [ ] Varicose veins                                               (1 Point)                     [ ] Post-partum < 6 weeks                                   (1 Point)             [ x] BMI > 25 Kg/m2                                            (1 Point)                     [ ] Hormonal therapy  or oral contraception          (1 Point)                 [ ] Sepsis (in the previous month)                        (1 Point)               [ ] History of pregnancy complications                 (1 point)  [ ] Pneumonia or serious lung disease                                               [ ] Unexplained or recurrent                     (1 Point)           (in the previous month)                               (1 Point)  [ ] Abnormal pulmonary function test                     (1 Point)                 SURGERY RELATED RISK FACTORS  [ ] Acute myocardial infarction                              (1 Point)               [ ]  Section                                             (1 Point)  [ ] Congestive heart failure (in the previous month)  (1 Point)      [ ] Minor surgery                                                  (1 Point)   [ ] Inflammatory bowel disease                             (1 Point)               [ ] Arthroscopic surgery                                        (2 Points)  [ ] Central venous access                                      (2 Points)                [x ] General surgery lasting more than 45 minutes (2 points)  [ ] Malignancy- Present or previous                   (2 Points)                [ ] Elective arthroplasty                                         (5 points)    [ ] Stroke (in the previous month)                          (5 Points)                                                                                                                                                           HEMATOLOGY RELATED FACTORS                                                 TRAUMA RELATED RISK FACTORS  [ ] Prior episodes of VTE                                     (3 Points)                [ ] Fracture of the hip, pelvis, or leg                       (5 Points)  [ ] Positive family history for VTE                         (3 Points)             [ ] Acute spinal cord injury (in the previous month)  (5 Points)  [ ] Prothrombin 85782 A                                     (3 Points)               [ ] Paralysis  (less than 1 month)                             (5 Points)  [ ] Factor V Leiden                                             (3 Points)                  [ ] Multiple Trauma within 1 month                        (5 Points)  [ ] Lupus anticoagulants                                     (3 Points)                                                           [ ] Anticardiolipin antibodies                               (3 Points)                                                       [ ] High homocysteine in the blood                      (3 Points)                                             [ ] Other congenital or acquired thrombophilia      (3 Points)                                                [ ] Heparin induced thrombocytopenia                  (3 Points)                                     Total Score [      3    ]

## 2024-04-02 ENCOUNTER — APPOINTMENT (OUTPATIENT)
Dept: INTERNAL MEDICINE | Facility: CLINIC | Age: 34
End: 2024-04-02
Payer: MEDICAID

## 2024-04-02 VITALS
DIASTOLIC BLOOD PRESSURE: 74 MMHG | WEIGHT: 293 LBS | OXYGEN SATURATION: 99 % | TEMPERATURE: 97.3 F | HEART RATE: 69 BPM | HEIGHT: 67.5 IN | BODY MASS INDEX: 45.45 KG/M2 | SYSTOLIC BLOOD PRESSURE: 122 MMHG

## 2024-04-02 DIAGNOSIS — Z01.818 ENCOUNTER FOR OTHER PREPROCEDURAL EXAMINATION: ICD-10-CM

## 2024-04-02 PROCEDURE — 99213 OFFICE O/P EST LOW 20 MIN: CPT

## 2024-04-02 RX ORDER — DEXTROAMPHETAMINE SACCHARATE, AMPHETAMINE ASPARTATE, DEXTROAMPHETAMINE SULFATE, AND AMPHETAMINE SULFATE 2.5; 2.5; 2.5; 2.5 MG/1; MG/1; MG/1; MG/1
10 TABLET ORAL
Refills: 0 | Status: ACTIVE | COMMUNITY

## 2024-04-02 NOTE — PHYSICAL EXAM
[Normal] : affect was normal and insight and judgment were intact [de-identified] : umbilicl hernia noted, not incarcerated

## 2024-04-02 NOTE — HISTORY OF PRESENT ILLNESS
[No Pertinent Cardiac History] : no history of aortic stenosis, atrial fibrillation, coronary artery disease, recent myocardial infarction, or implantable device/pacemaker [No Pertinent Pulmonary History] : no history of asthma, COPD, sleep apnea, or smoking [No Adverse Anesthesia Reaction] : no adverse anesthesia reaction in self or family member [Chronic Anticoagulation] : no chronic anticoagulation [Chronic Kidney Disease] : no chronic kidney disease [Diabetes] : no diabetes [(Patient denies any chest pain, claudication, dyspnea on exertion, orthopnea, palpitations or syncope)] : Patient denies any chest pain, claudication, dyspnea on exertion, orthopnea, palpitations or syncope [Moderate (4-6 METs)] : Moderate (4-6 METs) [FreeTextEntry1] : umbilical hernia repar [FreeTextEntry2] : 4/10/2024 [FreeTextEntry3] : Dr. Mychal Lawson [FreeTextEntry4] : 33F PMH ADHD and obesity presents for preop eval for umbilical hernia repair on 4/10/2024 with Dr. Mychal Lawson in [facility].  In regard to surgical risk, the patient has a good functional capacity for her age. She is able to climb 3-4 flights of stairs without chest pain and shortness of breath. She does not use any assistive ambulatory devices.  The patient has a drug allergy to sulfa - hives. She is not an easy bleeder and is not on anticoagulation. She has not been on chronic immunosuppression such as chronic prednisone recently. She denies diagnosis or symptoms of sleep apnea and denies any snorning or apenic episodes during the night. The patient denies signs and symptoms of active infection within the last 14 days. The patient denies signs and symptoms of decompensated cardiopulmonary disease including new dyspnea even with exertion, wheeze, cough, exertional chest pain, PND, orthopnea, claudication, TIA or stroke. She denies any smoking or drug history. She drinks alcohol occasionally.  The patient has not had surgery in the past but had epidurals during 3 vaginal deliveries and reported good tolerance to it. She denies any bleeding history and reactions to anesthesia in her family that she is aware of. She takes Adderall for ADHD. [FreeTextEntry7] : 3/22/2024 EKG NSR with no acute ischemic changes

## 2024-04-02 NOTE — REVIEW OF SYSTEMS
[Abdominal Pain] : no abdominal pain [Nausea] : no nausea [Constipation] : no constipation [Diarrhea] : diarrhea [Vomiting] : no vomiting [Melena] : no melena [Heartburn] : no heartburn [Negative] : Psychiatric [FreeTextEntry7] : umbilical hernia, pain with straining

## 2024-04-02 NOTE — PLAN
[FreeTextEntry1] : 33F PMH ADHD and obesity presents for preop eval for umbilical hernia repair on 4/10/2024 with Dr. Mychal Lawson in [facility].  # preop  - umbilical hernia repair on 4/10/2024 with Dr. Mychal Lawson in [facility]. - METS>4 - denies any cardiac, pulmonary, diabetic history, former smoker, quit 15 years ago - No surgeries in the past. Not on any anticoagulation. Not on any chronic steroids - She denies diagnosis of sleep apnea - no snoring and no apneic episodes at night - 1 points STOP-BANG - Low risk for moderate to severe JOSHUA - No associated significant or uncontrolled systemic disease or additional problems with ventilation or gas exchange - denies heart failure, arrhythmias, uncontrolled hypertension, cerebrovascular disease (eg, prior stroke, known carotid disease) - 3/22/2024 cbc, cmp normal 3/28/2024 - coags normal HCG negative - 3/22/2024 EKG with NSR and no acute ischemic changes - advised to stop NSAIDs and vitamins at least 7 days prior to procedure - discussed not to take Adderall 24 hours prior to procedure, patient verbalized understanding - RCRI 0 points Class I Risk 3.9% 30-day risk of death, MI, or cardiac arrest - patient at low risk for moderate-risk procedure

## 2024-04-02 NOTE — RESULTS/DATA
[] : results reviewed [de-identified] : normal [de-identified] : normal [de-identified] : normal [de-identified] : NSR with no acute ischemic changes

## 2024-04-02 NOTE — ASSESSMENT
[Patient Optimized for Surgery] : Patient optimized for surgery [No Further Testing Recommended] : no further testing recommended [FreeTextEntry4] : - 3/22/2024 cbc, cmp normal 3/28/2024 - coags normal HCG negative - 3/22/2024 EKG with NSR and no acute ischemic changes - advised to stop NSAIDs and vitamins at least 7 days prior to procedure - discussed not to take Adderall 24 hours prior to procedure, patient verbalized understanding - RCRI 0 points Class I Risk 3.9% 30-day risk of death, MI, or cardiac arrest - patient at low risk for moderate risk procedure

## 2024-04-05 NOTE — ASSESSMENT
[FreeTextEntry1] : 33F with morbid obesity, BMI 47, and slowly growing umbilical hernia with rectus diastasis s/p multiple pregnancies. Recommend robotic ventral hernia repair but I would like to obtain a CT prior to surgery to assess the abdominal wall anatomy. Her weight may preclude an eTEP repair - pending.

## 2024-04-05 NOTE — PHYSICAL EXAM
[JVD] : no jugular venous distention  [Normal Thyroid] : the thyroid was normal [Normal Breath Sounds] : Normal breath sounds [Normal Heart Sounds] : normal heart sounds [Abdominal Masses] : No abdominal masses [No HSM] : no hepatosplenomegaly [No Rash or Lesion] : No rash or lesion [Alert] : alert [Oriented to Person] : oriented to person [Oriented to Place] : oriented to place [Oriented to Time] : oriented to time [Calm] : calm [de-identified] : well-appearing, obese [de-identified] : GIOVANI [de-identified] : soft, ND, NT, +BS, +reducible umbilical hernia with diastasis, unable to palpate fascial edges.

## 2024-04-05 NOTE — HISTORY OF PRESENT ILLNESS
[de-identified] : 33F h/o obesity with BMI 47, multiple pregnancies, now presents with growing umbilical hernia and rectus diastasis. Patient reports greatly decreased strength in her core since delivering, which has precluded significant physical exercise. She states she feels a pulling, sharp sensation in the upper midline. Denies episodes of incarceration or bowel obstruction. No tobacco use. No NSAID use. No recent imaging.

## 2024-04-15 PROBLEM — K42.9 UMBILICAL HERNIA WITHOUT OBSTRUCTION OR GANGRENE: Chronic | Status: ACTIVE | Noted: 2024-03-28

## 2024-04-15 PROBLEM — E66.9 OBESITY, UNSPECIFIED: Chronic | Status: ACTIVE | Noted: 2024-03-28

## 2024-04-17 ENCOUNTER — APPOINTMENT (OUTPATIENT)
Dept: CT IMAGING | Facility: CLINIC | Age: 34
End: 2024-04-17
Payer: MEDICAID

## 2024-04-17 ENCOUNTER — OUTPATIENT (OUTPATIENT)
Dept: OUTPATIENT SERVICES | Facility: HOSPITAL | Age: 34
LOS: 1 days | End: 2024-04-17
Payer: MEDICAID

## 2024-04-17 DIAGNOSIS — K42.9 UMBILICAL HERNIA WITHOUT OBSTRUCTION OR GANGRENE: ICD-10-CM

## 2024-04-17 PROCEDURE — 74177 CT ABD & PELVIS W/CONTRAST: CPT | Mod: 26

## 2024-04-17 PROCEDURE — 74177 CT ABD & PELVIS W/CONTRAST: CPT

## 2024-04-22 ENCOUNTER — TRANSCRIPTION ENCOUNTER (OUTPATIENT)
Age: 34
End: 2024-04-22

## 2024-04-23 ENCOUNTER — APPOINTMENT (OUTPATIENT)
Dept: SURGERY | Facility: HOSPITAL | Age: 34
End: 2024-04-23

## 2024-04-23 ENCOUNTER — TRANSCRIPTION ENCOUNTER (OUTPATIENT)
Age: 34
End: 2024-04-23

## 2024-04-23 ENCOUNTER — OUTPATIENT (OUTPATIENT)
Dept: INPATIENT UNIT | Facility: HOSPITAL | Age: 34
LOS: 1 days | End: 2024-04-23
Payer: MEDICAID

## 2024-04-23 VITALS
RESPIRATION RATE: 17 BRPM | TEMPERATURE: 98 F | SYSTOLIC BLOOD PRESSURE: 109 MMHG | OXYGEN SATURATION: 99 % | DIASTOLIC BLOOD PRESSURE: 72 MMHG | HEART RATE: 79 BPM

## 2024-04-23 VITALS
WEIGHT: 293 LBS | RESPIRATION RATE: 16 BRPM | TEMPERATURE: 99 F | SYSTOLIC BLOOD PRESSURE: 140 MMHG | HEIGHT: 68 IN | HEART RATE: 64 BPM | DIASTOLIC BLOOD PRESSURE: 80 MMHG | OXYGEN SATURATION: 100 %

## 2024-04-23 DIAGNOSIS — K42.9 UMBILICAL HERNIA WITHOUT OBSTRUCTION OR GANGRENE: ICD-10-CM

## 2024-04-23 LAB — BLD GP AB SCN SERPL QL: SIGNIFICANT CHANGE UP

## 2024-04-23 PROCEDURE — 86900 BLOOD TYPING SEROLOGIC ABO: CPT

## 2024-04-23 PROCEDURE — C1781: CPT

## 2024-04-23 PROCEDURE — 36415 COLL VENOUS BLD VENIPUNCTURE: CPT

## 2024-04-23 PROCEDURE — S2900: CPT

## 2024-04-23 PROCEDURE — 86850 RBC ANTIBODY SCREEN: CPT

## 2024-04-23 PROCEDURE — 49596 RPR AA HRN 1ST > 10 NCR/STRN: CPT

## 2024-04-23 PROCEDURE — 49596 RPR AA HRN 1ST > 10 NCR/STRN: CPT | Mod: AS

## 2024-04-23 PROCEDURE — 15734 MUSCLE-SKIN GRAFT TRUNK: CPT

## 2024-04-23 PROCEDURE — S2900 ROBOTIC SURGICAL SYSTEM: CPT | Mod: NC

## 2024-04-23 PROCEDURE — 86901 BLOOD TYPING SEROLOGIC RH(D): CPT

## 2024-04-23 PROCEDURE — C9399: CPT

## 2024-04-23 PROCEDURE — 49595 RPR AA HRN 1ST > 10 RDC: CPT

## 2024-04-23 PROCEDURE — 15734 MUSCLE-SKIN GRAFT TRUNK: CPT | Mod: AS

## 2024-04-23 DEVICE — MESH VERSATEX 15X15CM: Type: IMPLANTABLE DEVICE | Status: FUNCTIONAL

## 2024-04-23 RX ORDER — BUPIVACAINE 13.3 MG/ML
20 INJECTION, SUSPENSION, LIPOSOMAL INFILTRATION ONCE
Refills: 0 | Status: DISCONTINUED | OUTPATIENT
Start: 2024-04-23 | End: 2024-04-23

## 2024-04-23 RX ORDER — FENTANYL CITRATE 50 UG/ML
50 INJECTION INTRAVENOUS
Refills: 0 | Status: DISCONTINUED | OUTPATIENT
Start: 2024-04-23 | End: 2024-04-23

## 2024-04-23 RX ORDER — SODIUM CHLORIDE 9 MG/ML
1000 INJECTION, SOLUTION INTRAVENOUS
Refills: 0 | Status: DISCONTINUED | OUTPATIENT
Start: 2024-04-23 | End: 2024-04-23

## 2024-04-23 RX ORDER — DEXTROAMPHETAMINE SACCHARATE, AMPHETAMINE ASPARTATE, DEXTROAMPHETAMINE SULFATE AND AMPHETAMINE SULFATE 1.875; 1.875; 1.875; 1.875 MG/1; MG/1; MG/1; MG/1
1 TABLET ORAL
Refills: 0 | DISCHARGE

## 2024-04-23 RX ORDER — ACETAMINOPHEN 500 MG
975 TABLET ORAL ONCE
Refills: 0 | Status: COMPLETED | OUTPATIENT
Start: 2024-04-23 | End: 2024-04-23

## 2024-04-23 RX ORDER — ONDANSETRON 8 MG/1
4 TABLET, FILM COATED ORAL ONCE
Refills: 0 | Status: DISCONTINUED | OUTPATIENT
Start: 2024-04-23 | End: 2024-04-23

## 2024-04-23 RX ADMIN — FENTANYL CITRATE 50 MICROGRAM(S): 50 INJECTION INTRAVENOUS at 16:15

## 2024-04-23 RX ADMIN — FENTANYL CITRATE 50 MICROGRAM(S): 50 INJECTION INTRAVENOUS at 16:00

## 2024-04-23 RX ADMIN — Medication 975 MILLIGRAM(S): at 11:20

## 2024-04-23 RX ADMIN — Medication 200 MILLIGRAM(S): at 13:30

## 2024-04-23 NOTE — ASU DISCHARGE PLAN (ADULT/PEDIATRIC) - ASU DC SPECIAL INSTRUCTIONSFT
Please wear abdominal binder at all times (except for shower) for 2 weeks. Please leave the umbilical dressing in place for 3 days post operatively. There is no incision underneath but it is there to prevent forming a fluid collection at the site.

## 2024-04-23 NOTE — BRIEF OPERATIVE NOTE - OPERATION/FINDINGS
eTEP  Fat containing umbilical hernia and sac reduced.  Primary closure of hernia defect and rectus diastasis   [id number] mesh placed  Hemostasis achieved

## 2024-04-23 NOTE — ASU DISCHARGE PLAN (ADULT/PEDIATRIC) - CARE PROVIDER_API CALL
Mychal Lawson  Surgery  [address] [place] [id number]  Phone: [phone]  Fax: [phone]  Established Patient  Follow Up Time: 2 weeks

## 2024-04-23 NOTE — BRIEF OPERATIVE NOTE - NSICDXBRIEFPROCEDURE_GEN_ALL_CORE_FT
PROCEDURES:  Robot-assisted repair of ventral hernia using component separation technique 23-Apr-2024 15:11:15 No component separation Ari Castellon

## 2024-04-23 NOTE — ASU DISCHARGE PLAN (ADULT/PEDIATRIC) - NS MD DC FALL RISK RISK
needs device and assist
For information on Fall & Injury Prevention, visit: [email or web address] OR  [email or web address] OR  https://www.cdc.gov/steadi/patient.html

## 2024-05-06 ENCOUNTER — APPOINTMENT (OUTPATIENT)
Dept: SURGERY | Facility: CLINIC | Age: 34
End: 2024-05-06
Payer: MEDICAID

## 2024-05-06 VITALS
BODY MASS INDEX: 45.45 KG/M2 | HEIGHT: 67.5 IN | SYSTOLIC BLOOD PRESSURE: 127 MMHG | RESPIRATION RATE: 16 BRPM | OXYGEN SATURATION: 99 % | TEMPERATURE: 98.1 F | HEART RATE: 66 BPM | DIASTOLIC BLOOD PRESSURE: 73 MMHG | WEIGHT: 293 LBS

## 2024-05-06 DIAGNOSIS — K42.9 UMBILICAL HERNIA W/OUT OBSTRUCTION OR GANGRENE: ICD-10-CM

## 2024-05-06 PROCEDURE — 99024 POSTOP FOLLOW-UP VISIT: CPT

## 2024-05-06 NOTE — ASSESSMENT
[FreeTextEntry1] : 33F with morbid obesity, BMI 47, and slowly growing umbilical hernia with rectus diastasis s/p uneventful robotic totally extraperitoneal repair.  Recovering uneventfully.  Resume normal activities follow-up as needed.  All questions answered.

## 2024-05-06 NOTE — HISTORY OF PRESENT ILLNESS
[de-identified] : 33F h/o obesity with BMI 47, multiple pregnancies, now presents with growing umbilical hernia and rectus diastasis. Patient reports greatly decreased strength in her core since delivering, which has precluded significant physical exercise. She states she feels a pulling, sharp sensation in the upper midline. Denies episodes of incarceration or bowel obstruction. No tobacco use. No NSAID use. No recent imaging. [de-identified] : Patient returns for routine postoperative follow-up status post robotic totally extraperitoneal ventral hernia repair.  Doing well.  Pain resolved.  No fever/chills.  No problems with incisions.

## 2024-05-06 NOTE — PHYSICAL EXAM
[JVD] : no jugular venous distention  [Normal Thyroid] : the thyroid was normal [Normal Breath Sounds] : Normal breath sounds [Normal Heart Sounds] : normal heart sounds [Abdominal Masses] : No abdominal masses [No HSM] : no hepatosplenomegaly [No Rash or Lesion] : No rash or lesion [Alert] : alert [Oriented to Person] : oriented to person [Oriented to Place] : oriented to place [Oriented to Time] : oriented to time [Calm] : calm [de-identified] : well-appearing, obese [de-identified] : GIOVANI [de-identified] : soft, ND, NT, +BS, no recurrent hernia palpated, incisions clean/dry/intact.

## 2025-04-29 ENCOUNTER — NON-APPOINTMENT (OUTPATIENT)
Age: 35
End: 2025-04-29

## 2025-05-13 ENCOUNTER — APPOINTMENT (OUTPATIENT)
Dept: INTERNAL MEDICINE | Facility: CLINIC | Age: 35
End: 2025-05-13
Payer: MEDICAID

## 2025-05-13 VITALS
HEIGHT: 68 IN | BODY MASS INDEX: 44.41 KG/M2 | OXYGEN SATURATION: 98 % | DIASTOLIC BLOOD PRESSURE: 85 MMHG | HEART RATE: 76 BPM | WEIGHT: 293 LBS | SYSTOLIC BLOOD PRESSURE: 139 MMHG

## 2025-05-13 DIAGNOSIS — E66.01 MORBID (SEVERE) OBESITY DUE TO EXCESS CALORIES: ICD-10-CM
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
## 2025-05-13 PROCEDURE — 99214 OFFICE O/P EST MOD 30 MIN: CPT

## 2025-05-13 PROCEDURE — G2211 COMPLEX E/M VISIT ADD ON: CPT | Mod: NC

## 2025-05-13 RX ORDER — DOXYCYCLINE HYCLATE 100 MG/1
100 CAPSULE ORAL
Refills: 0 | Status: ACTIVE | COMMUNITY
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
## (undated) DEVICE — XI CORD MONOPOLAR CAUTERY (GREEN)

## (undated) DEVICE — SUT VLOC 180 3-0 6" V-20 GREEN

## (undated) DEVICE — SOL IRR POUR H2O 1000ML

## (undated) DEVICE — TROCAR COVIDIEN VERSAPORT BLADELESS OPTICAL 5MM STANDARD

## (undated) DEVICE — XI SEAL UNIVERSIAL 5-12MM

## (undated) DEVICE — XI TIP COVER

## (undated) DEVICE — SUT VICRYL 0 27" UR-6

## (undated) DEVICE — ELCTR BOVIE PENCIL SMOKE EVACUATION 15FT

## (undated) DEVICE — XI OBTURATOR OPTICAL BLADELESS 8MM

## (undated) DEVICE — PACK ROBOTIC

## (undated) DEVICE — XI CORD BIPOLAR CAUTERY (BLUE)

## (undated) DEVICE — ELCTR GROUNDING PAD ADULT COVIDIEN

## (undated) DEVICE — XI DRAPE COLUMN

## (undated) DEVICE — STAPLER SKIN PROXIMATE

## (undated) DEVICE — DRAPE XL SHEET 77X98"

## (undated) DEVICE — SOL IRR POUR NS 0.9% 1000ML

## (undated) DEVICE — XI DRAPE ARM

## (undated) DEVICE — VENODYNE/SCD SLEEVE CALF MEDIUM

## (undated) DEVICE — DRAPE GENERAL ENDOSCOPY

## (undated) DEVICE — XI ARM NEEDLE DRIVER SUTURECUT MEGA 8MM

## (undated) DEVICE — DRAPE TOWEL BLUE STICKY

## (undated) DEVICE — XI ARM FORCEP PROGRASP 8MM

## (undated) DEVICE — XI ARM SCISSOR MONO CURVED

## (undated) DEVICE — SUT MONOCRYL 4-0 27" PS-2 UNDYED